# Patient Record
Sex: MALE | Race: ASIAN | NOT HISPANIC OR LATINO | URBAN - METROPOLITAN AREA
[De-identification: names, ages, dates, MRNs, and addresses within clinical notes are randomized per-mention and may not be internally consistent; named-entity substitution may affect disease eponyms.]

---

## 2017-06-19 ENCOUNTER — EMERGENCY (EMERGENCY)
Facility: HOSPITAL | Age: 22
LOS: 1 days | Discharge: HOME | End: 2017-06-19
Attending: EMERGENCY MEDICINE | Admitting: EMERGENCY MEDICINE
Payer: SELF-PAY

## 2017-06-19 VITALS
OXYGEN SATURATION: 100 % | TEMPERATURE: 98 F | RESPIRATION RATE: 16 BRPM | HEART RATE: 95 BPM | SYSTOLIC BLOOD PRESSURE: 129 MMHG | DIASTOLIC BLOOD PRESSURE: 85 MMHG

## 2017-06-19 PROCEDURE — 99283 EMERGENCY DEPT VISIT LOW MDM: CPT

## 2017-06-19 RX ORDER — CIPROFLOXACIN AND DEXAMETHASONE 3; 1 MG/ML; MG/ML
4 SUSPENSION/ DROPS AURICULAR (OTIC)
Qty: 1 | Refills: 0 | OUTPATIENT
Start: 2017-06-19 | End: 2017-06-26

## 2017-06-19 NOTE — ED PROVIDER NOTE - MEDICAL DECISION MAKING DETAILS
22 yo M with ear pain and ringing with signs of inflammation in L ear. Will give Ciprodex drops and prednisone for inflammation. Will give ENT follow up. Stable for discharge.

## 2017-06-19 NOTE — ED ADULT TRIAGE NOTE - CHIEF COMPLAINT QUOTE
pt c/o b/l ear pain and pressure. states he was treated by PMD for ear infection with z-pack but continues to experience discomfort. denies fevers.

## 2017-06-19 NOTE — ED PROVIDER NOTE - OBJECTIVE STATEMENT
20 yo M with no significant PMHx, presents to the ED c/o b/l ear pain and pressure x 1 week. States he was treated by urgent care for ear infection with z-pack. Pain has subsided but he feels b/l ear pressure and ringing which makes it hard for him to hear. He has been taking Advil with relief. Denies fevers, dizziness, drainage from ear.